# Patient Record
Sex: MALE | Race: WHITE | NOT HISPANIC OR LATINO | Employment: OTHER | URBAN - METROPOLITAN AREA
[De-identification: names, ages, dates, MRNs, and addresses within clinical notes are randomized per-mention and may not be internally consistent; named-entity substitution may affect disease eponyms.]

---

## 2019-12-12 ENCOUNTER — INITIAL CONSULT (OUTPATIENT)
Dept: PULMONOLOGY | Facility: CLINIC | Age: 74
End: 2019-12-12
Payer: MEDICARE

## 2019-12-12 VITALS
SYSTOLIC BLOOD PRESSURE: 130 MMHG | DIASTOLIC BLOOD PRESSURE: 55 MMHG | WEIGHT: 133 LBS | HEIGHT: 68 IN | BODY MASS INDEX: 20.16 KG/M2 | HEART RATE: 76 BPM | OXYGEN SATURATION: 95 %

## 2019-12-12 DIAGNOSIS — J44.9 CHRONIC OBSTRUCTIVE PULMONARY DISEASE, UNSPECIFIED COPD TYPE: Primary | ICD-10-CM

## 2019-12-12 DIAGNOSIS — C18.6 MALIGNANT NEOPLASM OF DESCENDING COLON: ICD-10-CM

## 2019-12-12 PROCEDURE — 1126F PR PAIN SEVERITY QUANTIFIED, NO PAIN PRESENT: ICD-10-PCS | Mod: S$GLB,,, | Performed by: INTERNAL MEDICINE

## 2019-12-12 PROCEDURE — 1159F PR MEDICATION LIST DOCUMENTED IN MEDICAL RECORD: ICD-10-PCS | Mod: S$GLB,,, | Performed by: INTERNAL MEDICINE

## 2019-12-12 PROCEDURE — 1159F MED LIST DOCD IN RCRD: CPT | Mod: S$GLB,,, | Performed by: INTERNAL MEDICINE

## 2019-12-12 PROCEDURE — 99204 OFFICE O/P NEW MOD 45 MIN: CPT | Mod: S$GLB,,, | Performed by: INTERNAL MEDICINE

## 2019-12-12 PROCEDURE — 1126F AMNT PAIN NOTED NONE PRSNT: CPT | Mod: S$GLB,,, | Performed by: INTERNAL MEDICINE

## 2019-12-12 PROCEDURE — 99204 PR OFFICE/OUTPT VISIT, NEW, LEVL IV, 45-59 MIN: ICD-10-PCS | Mod: S$GLB,,, | Performed by: INTERNAL MEDICINE

## 2019-12-12 RX ORDER — BRIMONIDINE TARTRATE 2 MG/ML
1 SOLUTION/ DROPS OPHTHALMIC 2 TIMES DAILY
COMMUNITY

## 2019-12-12 RX ORDER — DORZOLAMIDE HYDROCHLORIDE AND TIMOLOL MALEATE 20; 5 MG/ML; MG/ML
1 SOLUTION/ DROPS OPHTHALMIC 2 TIMES DAILY
COMMUNITY

## 2019-12-12 RX ORDER — FLUTICASONE PROPIONATE 50 MCG
1 SPRAY, SUSPENSION (ML) NASAL DAILY
COMMUNITY

## 2019-12-12 RX ORDER — PREDNISOLONE SODIUM PHOSPHATE 10 MG/ML
1 SOLUTION/ DROPS OPHTHALMIC DAILY
COMMUNITY

## 2019-12-12 RX ORDER — FAMOTIDINE 40 MG/1
40 TABLET, FILM COATED ORAL 2 TIMES DAILY
COMMUNITY

## 2019-12-12 RX ORDER — MULTIVITAMIN
1 TABLET ORAL DAILY
COMMUNITY

## 2019-12-12 RX ORDER — PYRIDOSTIGMINE BROMIDE 60 MG/1
60 TABLET ORAL 3 TIMES DAILY
COMMUNITY

## 2019-12-12 RX ORDER — AZATHIOPRINE 50 MG/1
50 TABLET ORAL 2 TIMES DAILY
COMMUNITY

## 2019-12-12 NOTE — PATIENT INSTRUCTIONS
Kicking the Smoking Habit  If you smoke, quitting is one of the best changes you can make for your heart and your overall health. Your risk of heart attack goes down within one day of putting out that last cigarette. As you go longer without smoking, your risk goes down even more. Quitting isnt easy, but millions of people have done it. You can, too. Its never too late to quit.  Getting started  Boost your chances of success by deciding on your quit plan. Your health care provider and cardiac rehab team can help you develop this plan. Even if youve already quit, its easy to slip back into smoking.  Your plan can help you avoid and recover from relapse.  In any case, start by setting a date to quit within a month, and do it.    Keys to your quit plan  · Talk to your healthcare provider about prescription medicines and nicotine replacement products that help stop the urge to smoke.   · Join a support group or quit smoking program. Talking with others about the challenges of quitting can help you get through them.  · Ask other smokers in your household to quit with you.  · Look for the cues in your life that you associate with smoking and avoid them.  Track your triggers  What gives you that I-[id number] feeling? List all the situations that make you want a cigarette. Then think of other ways to deal with these situations. Here are some examples:  Situation How I'll handle it   Finishing a meal Get up from the table and take a walk   Having an argument Find a quiet place and breathe deeply   Feeling lonely or bored Call a friend to talk         Tips for quitting successfully  · List the benefits of quitting such as reducing heart risks and saving money. Keep this list and review it whenever you feel like smoking.  · Get support. Let your friends know you may call them to chat when you have an urge to smoke.  · If youve tried to quit before without success, this time avoid the triggers that may cause  the relapse.  · Make the most of slip-ups. Try to learn from them, and then get back on track.  · Be accountable to your friends and your calendar so that you stay on track.  For family and friends  · Be supportive and patient. Quitting smoking can be difficult and stressful.  · If you smoke, nows a great time to quit. Even if you dont quit, never smoke around your loved one. Secondhand smoke is dangerous to his or her heart.  · The best goals are accomplished in teams. Remember that when your loved one states he or she wants to stop smoking.  Date Last Reviewed: 7/1/2016  © 6983-7105 [employer]. [address] [place] [address], [place] [address]. All rights reserved. This information is not intended as a substitute for professional medical care. Always follow your healthcare professional's instructions.    PFT's

## 2019-12-12 NOTE — PROGRESS NOTES
SUBJECTIVE:    Patient ID: Leonardo Cannon is a 74 y.o. male.    Chief Complaint: Establish Care (surgical clearance Dr Ordonez at [facility]/ Dr Elana danielle)    HPI The patient needs surgical clearance for a colectomy.    Past Medical History:   Diagnosis Date    Anemia     CKD (chronic kidney disease), stage III     Colon cancer     Emphysema of lung     GERD (gastroesophageal reflux disease)     Glaucoma     Myasthenia gravis     2018, [facility]     Past Surgical History:   Procedure Laterality Date    BILATERAL INGUINAL HERNIA REPAIR      CATARACT EXTRACTION, BILATERAL      CYSTOSCOPY W/ STONE MANIPULATION      DENTAL SURGERY      glaucoma shunt      LAPAROSCOPIC REPAIR OF INGUINAL HERNIA      NASAL SEPTUM SURGERY      R eyebrow      plasty    TRANSURETHRAL RESECTION OF PROSTATE       Family History   Problem Relation Age of Onset    Stroke Mother         Social History:   Marital Status: Single  Occupation:   Alcohol History:  reports that he drank alcohol.  Tobacco History:  reports that he has been smoking cigarettes. He started smoking about 65 years ago. He has a 130.00 pack-year smoking history. He has never used smokeless tobacco.  Drug History:  reports that he does not use drugs.    Review of patient's allergies indicates:  No Known Allergies    Current Outpatient Medications   Medication Sig Dispense Refill    azaTHIOprine (IMURAN) 50 mg Tab Take 50 mg by mouth 2 (two) times daily.      brimonidine 0.2% (ALPHAGAN) 0.2 % Drop 1 drop 2 (two) times daily.      dorzolamide-timolol 2-0.5% (COSOPT) 22.3-6.8 mg/mL ophthalmic solution Place 1 drop into the left eye 2 (two) times daily.      famotidine (PEPCID) 40 MG tablet Take 40 mg by mouth 2 (two) times daily.      fluticasone propionate (FLONASE) 50 mcg/actuation nasal spray 1 spray by Each Nostril route once daily.      latanoprostene bunod (VYZULTA) 0.024 % Drop Apply 1 drop to eye once. LT EYE       multivitamin (ONE DAILY MULTIVITAMIN) per tablet Take 1 tablet by mouth once daily.      prednisoLONE sodium phosphate (INFLAMASE FORTE) 1 % Drop Place 1 drop into the right eye once daily.      pyridostigmine (MESTINON) 60 mg Tab Take 60 mg by mouth 3 (three) times daily. 1/2 tablet QID       No current facility-administered medications for this visit.        Alpha-1 Antitrypsin:  Last PFT:   Last CT:IMPRESSION:  Moderate emphysematous changes with stable 21 x 11 mm juxtapleural  soft tissue mass in the right lung base most likely representing  rounded atelectasis. Malignancy cannot be completely excluded and a  follow-up PET CT is recommended    Stable scarring in the right middle lobe    Bilateral nonobstructing renal stones with multiple renal cysts    Moderate amount of fecal material throughout the colon compatible with  constipation. Short segment of irregular wall thickening of the  splenic flexure corresponding to patient's known malignancy.     Improvement of the wall thickening of the cecum and terminal ileum.  There is mild wall thickening of the cecum remaining. This may be  secondary to residual infectious or inflammatory process. Malignancy  cannot be excluded and correlation PET CT is recommended.    Sigmoid diverticulosis without diverticulitis    Moderate prostatic hypertrophy    Electronically Signed by Norma Simmons    Review of Systems  General: Feeling Well.  Eyes:Legally blind  ENT:  Sinus problems  Heart:: No chest pain or palpitations.  Lungs: lots of coughing, sputum, rhinitis, sputum is clear to white  GI: constipation and diarrhea  : No dysuria, hesitancy, or nocturia.  Musculoskeletal: lots of them hurt  Skin: has L arm issues  Neuro: tingling on L arm, fingertips numb for years  Lymph: No edema or adenopathy.  Psych: Depressed and frustrated  Endo: No weight change.    OBJECTIVE:      BP (!) 130/55 (BP Location: Left arm, Patient Position: Sitting, BP Method: Medium (Manual))    "Pulse 76   Ht 5' 8" (1.727 m)   Wt 60.3 kg (133 lb)   SpO2 95%   BMI 20.22 kg/m²     Physical Exam  GENERAL: Older thin patient in no distress.  HEENT: Pupils equal and reactive. Extraocular movements intact. Nose intact.      Pharynx moist.  NECK: Supple.   HEART: Regular rate and rhythm. No murmur or gallop auscultated.  LUNGS: Pleural rub crackles in the RLL,. Lung excursion symmetrical. No change in fremitus. No adventitial noises.  ABDOMEN: Bowel sounds present. Non-tender, no masses palpated.  EXTREMITIES: Normal muscle tone and joint movement, no cyanosis or clubbing.   LYMPHATICS: No adenopathy palpated, 2+ edema on the L, 1+ on the R..  SKIN: Dry, intact, no lesions.   NEURO: Cranial nerves II-XII intact. Motor strength 5/5 bilaterally, upper and lower extremities.  PSYCH: Appropriate affect.    Assessment:       1. Chronic obstructive pulmonary disease, unspecified COPD type    2. Malignant neoplasm of descending colon          Plan:       Chronic obstructive pulmonary disease, unspecified COPD type  -     Complete PFT with bronchodilator; Future    Malignant neoplasm of descending colon         Follow up if symptoms worsen or fail to improve.     PFT's, call patient with results  NIF  Send letter to Dr. Eagle,         "

## 2019-12-24 ENCOUNTER — HOSPITAL ENCOUNTER (OUTPATIENT)
Dept: PULMONOLOGY | Facility: HOSPITAL | Age: 74
Discharge: HOME | End: 2019-12-24
Attending: INTERNAL MEDICINE
Payer: MEDICARE

## 2019-12-24 DIAGNOSIS — J44.9 CHRONIC OBSTRUCTIVE PULMONARY DISEASE, UNSPECIFIED COPD TYPE: ICD-10-CM

## 2019-12-24 PROCEDURE — 94729 DIFFUSING CAPACITY: CPT

## 2019-12-24 PROCEDURE — 94727 GAS DIL/WSHOT DETER LNG VOL: CPT

## 2019-12-24 PROCEDURE — 94010 BREATHING CAPACITY TEST: CPT | Mod: XB

## 2019-12-24 PROCEDURE — 94060 EVALUATION OF WHEEZING: CPT

## 2020-01-06 ENCOUNTER — PATIENT MESSAGE (OUTPATIENT)
Dept: PULMONOLOGY | Facility: CLINIC | Age: 75
End: 2020-01-06

## 2020-01-07 NOTE — TELEPHONE ENCOUNTER
I spoke with the patient this morning. He is not interested in being on inhaler, just wants surgical clearance.  I also reviewed PFT results with him. Will send a clearance letter to Dr. Eagle,  is aware as well.

## 2024-12-04 DIAGNOSIS — D64.9 ANEMIA, UNSPECIFIED TYPE: Primary | ICD-10-CM

## 2024-12-04 DIAGNOSIS — D53.1 ANEMIA, MEGALOBLASTIC: ICD-10-CM

## 2024-12-05 ENCOUNTER — TELEPHONE (OUTPATIENT)
Facility: CLINIC | Age: 79
End: 2024-12-05

## 2024-12-05 NOTE — NURSING
Oncology Navigation   Intake  Date of Diagnosis: 12/04/24  Cancer Type: Benign hem  Type of Referral: External  Date of Referral: 12/04/24  Initial Nurse Navigator Contact: 12/05/24  Referral to Initial Contact Timeline (days): 1       LVM for return call to schedule. Referral to Dr. Fowler. Diagnosis megaloblastic anemia.

## 2024-12-27 ENCOUNTER — TELEPHONE (OUTPATIENT)
Facility: CLINIC | Age: 79
End: 2024-12-27

## 2024-12-27 NOTE — NURSING
Patient called requesting to reschedule his appointment with Dr. Fowler. R/S from 1/3 to 1/7/25. Time, date and location reviewed. Encouraged him to call with any other questions or concerns.

## 2025-01-07 ENCOUNTER — OFFICE VISIT (OUTPATIENT)
Facility: CLINIC | Age: 80
End: 2025-01-07
Payer: MEDICARE

## 2025-01-07 ENCOUNTER — TELEPHONE (OUTPATIENT)
Facility: CLINIC | Age: 80
End: 2025-01-07

## 2025-01-07 ENCOUNTER — LAB VISIT (OUTPATIENT)
Dept: LAB | Facility: HOSPITAL | Age: 80
End: 2025-01-07
Attending: INTERNAL MEDICINE
Payer: MEDICARE

## 2025-01-07 VITALS
HEIGHT: 61 IN | SYSTOLIC BLOOD PRESSURE: 118 MMHG | TEMPERATURE: 98 F | HEART RATE: 77 BPM | DIASTOLIC BLOOD PRESSURE: 71 MMHG | RESPIRATION RATE: 16 BRPM | BODY MASS INDEX: 24.97 KG/M2

## 2025-01-07 DIAGNOSIS — N18.4 ANEMIA ASSOCIATED WITH STAGE 4 CHRONIC RENAL FAILURE: ICD-10-CM

## 2025-01-07 DIAGNOSIS — D53.9 NUTRITIONAL ANEMIA: ICD-10-CM

## 2025-01-07 DIAGNOSIS — D53.1 ANEMIA, MEGALOBLASTIC: ICD-10-CM

## 2025-01-07 DIAGNOSIS — E07.9 THYROID DYSFUNCTION: ICD-10-CM

## 2025-01-07 DIAGNOSIS — D63.1 ANEMIA ASSOCIATED WITH STAGE 4 CHRONIC RENAL FAILURE: ICD-10-CM

## 2025-01-07 DIAGNOSIS — D53.9 NUTRITIONAL ANEMIA: Primary | ICD-10-CM

## 2025-01-07 DIAGNOSIS — N40.0 PROSTATISM: ICD-10-CM

## 2025-01-07 LAB
ALBUMIN SERPL BCP-MCNC: 3.9 G/DL (ref 3.5–5.2)
ALP SERPL-CCNC: 69 U/L (ref 55–135)
ALT SERPL W/O P-5'-P-CCNC: 6 U/L (ref 10–44)
ANION GAP SERPL CALC-SCNC: 9 MMOL/L (ref 8–16)
AST SERPL-CCNC: 13 U/L (ref 10–40)
BASOPHILS # BLD AUTO: 0.06 K/UL (ref 0–0.2)
BASOPHILS NFR BLD: 1 % (ref 0–1.9)
BILIRUB SERPL-MCNC: 0.4 MG/DL (ref 0.1–1)
BUN SERPL-MCNC: 31 MG/DL (ref 8–23)
CALCIUM SERPL-MCNC: 9.4 MG/DL (ref 8.7–10.5)
CHLORIDE SERPL-SCNC: 111 MMOL/L (ref 95–110)
CO2 SERPL-SCNC: 21 MMOL/L (ref 23–29)
CREAT SERPL-MCNC: 2.7 MG/DL (ref 0.5–1.4)
DIFFERENTIAL METHOD BLD: ABNORMAL
EOSINOPHIL # BLD AUTO: 0.5 K/UL (ref 0–0.5)
EOSINOPHIL NFR BLD: 7.5 % (ref 0–8)
ERYTHROCYTE [DISTWIDTH] IN BLOOD BY AUTOMATED COUNT: 17.5 % (ref 11.5–14.5)
EST. GFR  (NO RACE VARIABLE): 23 ML/MIN/1.73 M^2
FERRITIN SERPL-MCNC: 120.2 NG/ML (ref 20–300)
FOLATE SERPL-MCNC: 15.8 NG/ML (ref 4–24)
GLUCOSE SERPL-MCNC: 101 MG/DL (ref 70–110)
HCT VFR BLD AUTO: 27.5 % (ref 40–54)
HGB BLD-MCNC: 8.4 G/DL (ref 14–18)
IMM GRANULOCYTES # BLD AUTO: 0.01 K/UL (ref 0–0.04)
IMM GRANULOCYTES NFR BLD AUTO: 0.2 % (ref 0–0.5)
LYMPHOCYTES # BLD AUTO: 1 K/UL (ref 1–4.8)
LYMPHOCYTES NFR BLD: 17.1 % (ref 18–48)
MCH RBC QN AUTO: 32.3 PG (ref 27–31)
MCHC RBC AUTO-ENTMCNC: 30.5 G/DL (ref 32–36)
MCV RBC AUTO: 106 FL (ref 82–98)
MONOCYTES # BLD AUTO: 0.5 K/UL (ref 0.3–1)
MONOCYTES NFR BLD: 7.9 % (ref 4–15)
NEUTROPHILS # BLD AUTO: 4 K/UL (ref 1.8–7.7)
NEUTROPHILS NFR BLD: 66.3 % (ref 38–73)
NRBC BLD-RTO: 0 /100 WBC
PLATELET # BLD AUTO: 273 K/UL (ref 150–450)
PMV BLD AUTO: 10.1 FL (ref 9.2–12.9)
POTASSIUM SERPL-SCNC: 4.9 MMOL/L (ref 3.5–5.1)
PROT SERPL-MCNC: 8.1 G/DL (ref 6–8.4)
RBC # BLD AUTO: 2.6 M/UL (ref 4.6–6.2)
RETICS/RBC NFR AUTO: 1.1 % (ref 0.4–2)
SODIUM SERPL-SCNC: 141 MMOL/L (ref 136–145)
TSH SERPL DL<=0.005 MIU/L-ACNC: 3.37 UIU/ML (ref 0.34–5.6)
VIT B12 SERPL-MCNC: 312 PG/ML (ref 210–950)
WBC # BLD AUTO: 6.04 K/UL (ref 3.9–12.7)

## 2025-01-07 PROCEDURE — 82728 ASSAY OF FERRITIN: CPT | Performed by: INTERNAL MEDICINE

## 2025-01-07 PROCEDURE — 36415 COLL VENOUS BLD VENIPUNCTURE: CPT | Performed by: INTERNAL MEDICINE

## 2025-01-07 PROCEDURE — 99214 OFFICE O/P EST MOD 30 MIN: CPT | Mod: PBBFAC,PN | Performed by: INTERNAL MEDICINE

## 2025-01-07 PROCEDURE — 80053 COMPREHEN METABOLIC PANEL: CPT | Performed by: INTERNAL MEDICINE

## 2025-01-07 PROCEDURE — 84402 ASSAY OF FREE TESTOSTERONE: CPT | Performed by: INTERNAL MEDICINE

## 2025-01-07 PROCEDURE — 82607 VITAMIN B-12: CPT | Performed by: INTERNAL MEDICINE

## 2025-01-07 PROCEDURE — 85025 COMPLETE CBC W/AUTO DIFF WBC: CPT | Performed by: INTERNAL MEDICINE

## 2025-01-07 PROCEDURE — 99999 PR PBB SHADOW E&M-EST. PATIENT-LVL IV: CPT | Mod: PBBFAC,,, | Performed by: INTERNAL MEDICINE

## 2025-01-07 PROCEDURE — 84443 ASSAY THYROID STIM HORMONE: CPT | Performed by: INTERNAL MEDICINE

## 2025-01-07 PROCEDURE — G2211 COMPLEX E/M VISIT ADD ON: HCPCS | Mod: S$PBB,,, | Performed by: INTERNAL MEDICINE

## 2025-01-07 PROCEDURE — 85045 AUTOMATED RETICULOCYTE COUNT: CPT | Performed by: INTERNAL MEDICINE

## 2025-01-07 PROCEDURE — 82746 ASSAY OF FOLIC ACID SERUM: CPT | Performed by: INTERNAL MEDICINE

## 2025-01-07 PROCEDURE — 99205 OFFICE O/P NEW HI 60 MIN: CPT | Mod: S$PBB,,, | Performed by: INTERNAL MEDICINE

## 2025-01-07 PROCEDURE — 82668 ASSAY OF ERYTHROPOIETIN: CPT | Performed by: INTERNAL MEDICINE

## 2025-01-07 PROCEDURE — 82784 ASSAY IGA/IGD/IGG/IGM EACH: CPT | Mod: 91 | Performed by: INTERNAL MEDICINE

## 2025-01-07 RX ORDER — FERROUS SULFATE 325(65) MG
325 TABLET ORAL
COMMUNITY

## 2025-01-07 RX ORDER — ALBUTEROL SULFATE 90 UG/1
2 INHALANT RESPIRATORY (INHALATION) EVERY 4 HOURS PRN
COMMUNITY

## 2025-01-07 NOTE — LETTER
January 14, 2025        Henry Tierney MD  [address]  [facility] [place] [address]             [facility]  [address]  [place] [address]  Phone: [phone]  Fax: [phone]   Patient: Aleksandr Cannon   MR Number: [id number]   YOB: 1945   Date of Visit: 1/7/2025       Dear Dr. Tierney:    Thank you for referring Aleksandr Cannon to me for evaluation. Below are the relevant portions of my assessment and plan of care.            If you have questions, please do not hesitate to call me. I look forward to following Aleksandr along with you.    Sincerely,      VLAD Fowler MD           CC  No Recipients

## 2025-01-07 NOTE — TELEPHONE ENCOUNTER
Faxed lab orders to  - [phone]    Immunofixation Electrophoresis [[id number]] (Order [id number])   Protein Electrophoresis, Serum [EMH038] (Order [id number])     1st floor lab to be resulted at [facility]

## 2025-01-09 LAB
IGA SERPL-MCNC: 54 MG/DL (ref 61–437)
IGG SERPL-MCNC: 907 MG/DL (ref 603–1613)
IGM SERPL-MCNC: 2385 MG/DL (ref 15–143)

## 2025-01-10 LAB
EPO SERPL-ACNC: 24.6 MIU/ML (ref 2.6–18.5)
TESTOST FREE SERPL-MCNC: 1.1 PG/ML (ref 6.6–18.1)

## 2025-01-17 ENCOUNTER — TELEPHONE (OUTPATIENT)
Facility: CLINIC | Age: 80
End: 2025-01-17
Payer: MEDICARE

## 2025-01-17 NOTE — NURSING
Received call from patient. He asked about lab work that was ordered during last Dr. Fowler appointment. Informed patient that lab orders were faxed to [facility]. Patient inquired about lung imaging that Dr. Fowler wanted him to complete. This was not noted in chart. Sent staff message to office to inform patient and if needed send imaging orders to [place] to complete.

## 2025-01-19 DIAGNOSIS — R91.8 LUNG MASS: Primary | ICD-10-CM

## 2025-01-19 DIAGNOSIS — R91.1 SOLITARY PULMONARY NODULE: ICD-10-CM

## 2025-01-25 ENCOUNTER — TELEPHONE (OUTPATIENT)
Facility: CLINIC | Age: 80
End: 2025-01-25
Payer: MEDICARE

## 2025-01-25 NOTE — TELEPHONE ENCOUNTER
Spoke to Patient.  Explained that CT orders were faxed to . Patient verbally demonstrated understanding.

## 2025-01-25 NOTE — TELEPHONE ENCOUNTER
----- Message from July sent at 1/24/2025 12:05 PM CST -----  Pt is asking for CT orders to be changed to [place] from Imaging. Please call pt to let him know when orders are sent.     [phone]

## 2025-02-04 ENCOUNTER — TELEPHONE (OUTPATIENT)
Facility: CLINIC | Age: 80
End: 2025-02-04
Payer: MEDICARE

## 2025-02-04 NOTE — TELEPHONE ENCOUNTER
Spoke to patient. He explained that he did not have any way of seeing his lab or imaging (CT chest) results.  He asked that we mail him a copy after Dr. Tony makes his assessment.   We verified address. I added that it may be tomorrow before we can get back with him. Patient verbally demonstrated understanding.

## 2025-02-05 ENCOUNTER — TELEPHONE (OUTPATIENT)
Facility: CLINIC | Age: 80
End: 2025-02-05
Payer: MEDICARE

## 2025-02-05 NOTE — NURSING
Returned call to patient. He stated he completed lab and CT scan at [facility] and would like to move up his appointment from May. Reviewed Dr. Fowler last office visit note. Recommendation was to RTC 2-3 weeks. Rescheduled patient to March 10th. Patient stated he needed time to find transportation. Sent message to Dr. Fowler staff.

## 2025-03-07 ENCOUNTER — TELEPHONE (OUTPATIENT)
Facility: CLINIC | Age: 80
End: 2025-03-07
Payer: MEDICARE

## 2025-03-18 ENCOUNTER — OFFICE VISIT (OUTPATIENT)
Facility: CLINIC | Age: 80
End: 2025-03-18
Payer: MEDICARE

## 2025-03-18 VITALS
BODY MASS INDEX: 22.66 KG/M2 | OXYGEN SATURATION: 100 % | DIASTOLIC BLOOD PRESSURE: 65 MMHG | WEIGHT: 120 LBS | TEMPERATURE: 97 F | HEIGHT: 61 IN | SYSTOLIC BLOOD PRESSURE: 116 MMHG | RESPIRATION RATE: 16 BRPM | HEART RATE: 86 BPM

## 2025-03-18 DIAGNOSIS — R91.1 SOLITARY PULMONARY NODULE: ICD-10-CM

## 2025-03-18 DIAGNOSIS — E53.8 VITAMIN B12 DEFICIENCY DUE TO INTESTINAL MALABSORPTION: ICD-10-CM

## 2025-03-18 DIAGNOSIS — K90.9 VITAMIN B12 DEFICIENCY DUE TO INTESTINAL MALABSORPTION: ICD-10-CM

## 2025-03-18 DIAGNOSIS — D63.1 ANEMIA ASSOCIATED WITH STAGE 4 CHRONIC RENAL FAILURE: ICD-10-CM

## 2025-03-18 DIAGNOSIS — N18.4 ANEMIA ASSOCIATED WITH STAGE 4 CHRONIC RENAL FAILURE: ICD-10-CM

## 2025-03-18 DIAGNOSIS — J98.4 CAVITATING MASS OF MIDDLE LOBE OF RIGHT LUNG: Primary | ICD-10-CM

## 2025-03-18 PROCEDURE — 99999 PR PBB SHADOW E&M-EST. PATIENT-LVL IV: CPT | Mod: PBBFAC,,, | Performed by: INTERNAL MEDICINE

## 2025-03-18 PROCEDURE — 99214 OFFICE O/P EST MOD 30 MIN: CPT | Mod: PBBFAC,PN | Performed by: INTERNAL MEDICINE

## 2025-03-18 NOTE — PROGRESS NOTES
[facility] [address] Hematology Oncology In Office Subsequent Encounter Note    3/18/25    Subjective:      Patient ID:   Aleksandr Cannon  79 y.o. male  1945  Tru Tierney    Chief Complaint:   Anemia  (Follow up)      HPI:  79 y.o. male here for evaluation of anemia.  He has history of colon cancer treated with surgery per Dr. Eagle 7 or 8 years ago.  He last saw Dr. Huitron approximately 3 years ago.  Energy level is 3/10.    Medications include Imuran, Pepcid, Mestinon, Vyzulta    He is a current smoker of 2 packs per day for 70 years, starting in 1954.  He does not drink alcohol with regularity.  He does not have a history of drug allergies.    He has history of glaucoma, COPD, BPH, colon cancer 7 or 8 years ago, chronic kidney disease stage 3, GERD, and myasthenia gravis diagnosed in 2018.    Hemoglobin has gone from 8.4-10.5 with an MCV of 102.  His EGFR is 25 and his testosterone is 1.1.  He has a IgM of 03/20/1985.  Erythropoietin is 24.6 and his ferritin is now 120 with a folate of 15.8 and a B12 of 312.  He is to take B12 sublingually.  Labs will be rechecked at [facility] in June 20, 2025.  He sees Dr. Tariq  for his pulmonary evaluation.  He is on iron, multivitamin, Imuran for treatment of myasthenia gravis.    He has burning symptoms going down laterally at the left leg raising question of mononeuropathy.  He follows up with Dr. Tierney.    A CT scan from January 27, 2025 shows a 3.9 cm mass with cavitation in right lung and right pleural effusion is seen.  This raises question of malignancy or infection.  Refer to  for evaluation.    ROS:   GEN: + weight loss  HEENT: normal with no HA's, sore throat, stiff neck, changes in vision  CV: normal with no CP, SOB, PND, FRANKLIN or orthopnea  PULM: COPD Hx, 1.9 cm nodule on lung, checked with CT chest.  GI: See HPI  : normal with no hematuria, dysuria  BREAST: normal with no mass, discharge, pain  SKIN: normal with no  rash, erythema, bruising, or swelling     Past Medical History:   Diagnosis Date    Anemia     CKD (chronic kidney disease), stage III     Colon cancer     Emphysema of lung     GERD (gastroesophageal reflux disease)     Glaucoma     Myasthenia gravis     2018, [facility]     Past Surgical History:   Procedure Laterality Date    BILATERAL INGUINAL HERNIA REPAIR      CATARACT EXTRACTION, BILATERAL      CYSTOSCOPY W/ STONE MANIPULATION      DENTAL SURGERY      glaucoma shunt      LAPAROSCOPIC REPAIR OF INGUINAL HERNIA      NASAL SEPTUM SURGERY      R eyebrow      plasty    TRANSURETHRAL RESECTION OF PROSTATE         Review of patient's allergies indicates:  No Known Allergies  Social History     Socioeconomic History    Marital status: Single   Tobacco Use    Smoking status: Every Day     Current packs/day: 2.00     Average packs/day: 2.0 packs/day for 70.7 years (141.4 ttl pk-yrs)     Types: Cigarettes     Start date: 7/12/1954    Smokeless tobacco: Never   Substance and Sexual Activity    Alcohol use: Not Currently     Comment: quit 3 years ago.     Drug use: Never    Sexual activity: Not Currently     Social Drivers of Health     Physical Activity: Sufficiently Active (11/9/2020)    Received from [facility]    Exercise Vital Sign     Days of Exercise per Week: 7 days     Minutes of Exercise per Session: 60 min   Stress: Stress Concern Present (11/9/2020)    Received from [facility] and [facility]    [employer] - Occupational Stress Questionnaire     Feeling of Stress : To some extent         Current Outpatient Medications:     albuterol (PROVENTIL/VENTOLIN HFA) 90 mcg/actuation inhaler, Inhale 2 puffs into the lungs every 4 (four) hours as needed., Disp: , Rfl:     azaTHIOprine (IMURAN) 50 mg Tab, Take 50 mg by mouth 2 (two) times daily., Disp: , Rfl:     brimonidine 0.2% (ALPHAGAN) 0.2 % Drop, 1 drop 2 (two) times daily.,  "Disp: , Rfl:     famotidine (PEPCID) 40 MG tablet, Take 40 mg by mouth 2 (two) times daily., Disp: , Rfl:     ferrous sulfate (FEOSOL) 325 mg (65 mg iron) Tab tablet, Take 325 mg by mouth with breakfast., Disp: , Rfl:     fluticasone propionate (FLONASE) 50 mcg/actuation nasal spray, 1 spray by Each Nostril route once daily., Disp: , Rfl:     latanoprostene bunod (VYZULTA) 0.024 % Drop, Apply 1 drop to eye once. LT EYE, Disp: , Rfl:     multivitamin (ONE DAILY MULTIVITAMIN) per tablet, Take 1 tablet by mouth once daily., Disp: , Rfl:     prednisoLONE sodium phosphate (INFLAMASE FORTE) 1 % Drop, Place 1 drop into the right eye once daily., Disp: , Rfl:     pyridostigmine (MESTINON) 60 mg Tab, Take 60 mg by mouth 3 (three) times daily. 1/2 tablet QID, Disp: , Rfl:     dorzolamide-timolol 2-0.5% (COSOPT) 22.3-6.8 mg/mL ophthalmic solution, Place 1 drop into the left eye 2 (two) times daily. (Patient not taking: Reported on 3/18/2025), Disp: , Rfl:           Objective:   Vitals:  Blood pressure 116/65, pulse 86, temperature 97 °F (36.1 °C), temperature source Temporal, resp. rate 16, height 5' 1.2" (1.554 m), weight 54.4 kg (120 lb), SpO2 100%.    Physical Examination:   GEN: no apparent distress, comfortable  HEAD: atraumatic and normocephalic  EYES: no pallor, no icterus  ENT:  no pharyngeal erythema, external ears WNL; no nasal discharge  NECK: no masses, thyroid normal, trachea midline, no LAD/LN's, supple  CV: RRR with no murmur; normal pulse; no pedal edema  CHEST: Normal respiratory effort; CTAB; normal breath sounds; no wheeze or crackles  ABDOM: nontender and nondistended; soft;  no rebound/guarding, L/S NP  MUSC/Skeletal: ROM normal; no crepitus; joints normal  EXTREM: no clubbing, cyanosis, inflammation or swelling  SKIN: no rashes, lesions, ulcers, petechiae   : no cvat  NEURO: grossly intact; motor/sensory WNL;  no tremors  PSYCH: normal mood, affect and behavior  LYMPH: normal cervical, supraclavicular, " axillary LN's      Labs:   Lab Results   Component Value Date    WBC 6.04 01/07/2025    HGB 8.4 (L) 01/07/2025    HCT 27.5 (L) 01/07/2025     (H) 01/07/2025     01/07/2025    CMP  Sodium   Date Value Ref Range Status   01/07/2025 141 136 - 145 mmol/L Final     Potassium   Date Value Ref Range Status   01/07/2025 4.9 3.5 - 5.1 mmol/L Final     Chloride   Date Value Ref Range Status   01/07/2025 111 (H) 95 - 110 mmol/L Final     CO2   Date Value Ref Range Status   01/07/2025 21 (L) 23 - 29 mmol/L Final     Glucose   Date Value Ref Range Status   01/07/2025 101 70 - 110 mg/dL Final     BUN   Date Value Ref Range Status   01/07/2025 31 (H) 8 - 23 mg/dL Final     Creatinine   Date Value Ref Range Status   01/07/2025 2.7 (H) 0.5 - 1.4 mg/dL Final     Calcium   Date Value Ref Range Status   01/07/2025 9.4 8.7 - 10.5 mg/dL Final     Total Protein   Date Value Ref Range Status   01/07/2025 8.1 6.0 - 8.4 g/dL Final     Albumin   Date Value Ref Range Status   01/07/2025 3.9 3.5 - 5.2 g/dL Final     Total Bilirubin   Date Value Ref Range Status   01/07/2025 0.4 0.1 - 1.0 mg/dL Final     Comment:     For infants and newborns, interpretation of results should be based  on gestational age, weight and in agreement with clinical  observations.    Premature Infant recommended reference ranges:  Up to 24 hours.............<8.0 mg/dL  Up to 48 hours............<12.0 mg/dL  3-5 days..................<15.0 mg/dL  6-29 days.................<15.0 mg/dL       Alkaline Phosphatase   Date Value Ref Range Status   01/07/2025 69 55 - 135 U/L Final     AST   Date Value Ref Range Status   01/07/2025 13 10 - 40 U/L Final     ALT   Date Value Ref Range Status   01/07/2025 6 (L) 10 - 44 U/L Final     Anion Gap   Date Value Ref Range Status   01/07/2025 9 8 - 16 mmol/L Final     Testosterone level is 1.1, erythropoietin is 24.6, IgM is increased at 2385, B12 is 312, folate is 16, ferritin is 120, TSH is 3.367,  creatinine is 2.7 and  total protein is 8.1 and EGFR is 23 and reticulocyte count is 1.1    Assessment:   (1) 79 y.o. male with multifactorial macrocytic anemia    (2) 1st of all , this is a macrocytic anemia and this does raise the question of B12 or folate deficiency or myelodysplastic syndrome.  His B12 level is in the low-normal range at 312., we try to keep the B12 greater than 400.  Started on B12 supplement 1 sublingually daily.    (3) also there is a component of the anemia secondary to renal insufficiency with a hemoglobin of 8.6 and a erythropoietin level of 24.6 in the setting of an EGFR of 23.  Besides giving a trial of B12, I expect we will also give a trial of Procrit or Retacrit 68064 units subQ weekly with titration for a hemoglobin of 10.    (4) also a factor in the anemia is his hypotestosteronism at 1.1.    (5) of note his IgM level is increased at 2385, additional labs will be checked to see if this is a monoclonal protein spike.    (6) after protein results return and after we give him a trial of Procrit or Retacrit and B12, we can then make a decision on bone marrow aspiration and biopsy if needed.    (7) he is on chronic Imuran therapy.  The macrocytosis can be explained by chronic Imuran therapy through the effects of Imuran on impairing red blood cell maturation in the bone marrow.    (8) anemia improved with a hemoglobin of 10.6 from 8.4 on B12 and B6 supplementation.    (9) 3.9 cm cavitary mass on CT scan of chest on the right lung with right pleural effusion.  Refer to pulmonary for evaluation, Dr. Tariq.    RTC 6 weeks.    Tomas Tony MD  [facility]   3/18/25    RTC 2-3 weeks.

## 2025-03-18 NOTE — LETTER
March 22, 2025        Kavin Tariq MD  [address]  [address]  [place] [address]             [facility]  [address]  [address]  [place] [address]  Phone: [phone]  Fax: [phone]   Patient: Aleksandr Cannon   MR Number: [id number]   YOB: 1945   Date of Visit: 3/18/2025       Dear Dr. Tariq:    Thank you for referring Aleksandr Cannon to me for evaluation. Below are the relevant portions of my assessment and plan of care.            If you have questions, please do not hesitate to call me. I look forward to following Aleksandr along with you.    Sincerely,      VLAD Fowler MD           CC  No Recipients

## 2025-04-02 DIAGNOSIS — R91.8 LUNG MASS: Primary | ICD-10-CM

## 2025-04-03 ENCOUNTER — TELEPHONE (OUTPATIENT)
Dept: PULMONOLOGY | Facility: CLINIC | Age: 80
End: 2025-04-03
Payer: MEDICARE

## 2025-04-03 NOTE — TELEPHONE ENCOUNTER
Patient would like to know when is he to do his Chest CT? He just had one done January 2025. He has an appointment scheduled to see you 6/11/25.   Also would like CT order changed to [place].

## 2025-04-07 NOTE — TELEPHONE ENCOUNTER
Notified patient he has an appointment in June to see Dr. Tariq and that his CT order was faxed to [place] to schedule.

## 2025-05-08 ENCOUNTER — LAB VISIT (OUTPATIENT)
Dept: LAB | Facility: HOSPITAL | Age: 80
End: 2025-05-08
Attending: INTERNAL MEDICINE
Payer: MEDICARE

## 2025-05-08 ENCOUNTER — OFFICE VISIT (OUTPATIENT)
Facility: CLINIC | Age: 80
End: 2025-05-08
Payer: MEDICARE

## 2025-05-08 VITALS
DIASTOLIC BLOOD PRESSURE: 59 MMHG | HEIGHT: 61 IN | TEMPERATURE: 98 F | SYSTOLIC BLOOD PRESSURE: 132 MMHG | RESPIRATION RATE: 16 BRPM | BODY MASS INDEX: 22.94 KG/M2 | HEART RATE: 96 BPM | WEIGHT: 121.5 LBS | OXYGEN SATURATION: 98 %

## 2025-05-08 DIAGNOSIS — E53.8 VITAMIN B12 DEFICIENCY DUE TO INTESTINAL MALABSORPTION: ICD-10-CM

## 2025-05-08 DIAGNOSIS — K90.9 VITAMIN B12 DEFICIENCY DUE TO INTESTINAL MALABSORPTION: Primary | ICD-10-CM

## 2025-05-08 DIAGNOSIS — N18.4 ANEMIA ASSOCIATED WITH STAGE 4 CHRONIC RENAL FAILURE: ICD-10-CM

## 2025-05-08 DIAGNOSIS — D64.9 ANEMIA, UNSPECIFIED TYPE: ICD-10-CM

## 2025-05-08 DIAGNOSIS — K90.9 VITAMIN B12 DEFICIENCY DUE TO INTESTINAL MALABSORPTION: ICD-10-CM

## 2025-05-08 DIAGNOSIS — E53.8 VITAMIN B12 DEFICIENCY DUE TO INTESTINAL MALABSORPTION: Primary | ICD-10-CM

## 2025-05-08 DIAGNOSIS — D63.1 ANEMIA ASSOCIATED WITH STAGE 4 CHRONIC RENAL FAILURE: ICD-10-CM

## 2025-05-08 LAB
ABSOLUTE EOSINOPHIL (SMH): 0.18 K/UL
ABSOLUTE MONOCYTE (SMH): 0.52 K/UL (ref 0.3–1)
ABSOLUTE NEUTROPHIL COUNT (SMH): 4.9 K/UL (ref 1.8–7.7)
ANISOCYTOSIS BLD QL SMEAR: SLIGHT
BASOPHILS # BLD AUTO: 0.06 K/UL
BASOPHILS NFR BLD AUTO: 0.9 %
DACRYOCYTES BLD QL SMEAR: NORMAL
ERYTHROCYTE [DISTWIDTH] IN BLOOD BY AUTOMATED COUNT: 17 % (ref 11.5–14.5)
FERRITIN SERPL-MCNC: 38.7 NG/ML (ref 20–300)
FOLATE SERPL-MCNC: >22.3 NG/ML (ref 4–24)
HCT VFR BLD AUTO: 29.9 % (ref 40–54)
HGB BLD-MCNC: 9.4 GM/DL (ref 14–18)
HYPOCHROMIA BLD QL SMEAR: NORMAL
IMM GRANULOCYTES # BLD AUTO: 0.03 K/UL (ref 0–0.04)
IMM GRANULOCYTES NFR BLD AUTO: 0.5 % (ref 0–0.5)
LYMPHOCYTES # BLD AUTO: 0.9 K/UL (ref 1–4.8)
MCH RBC QN AUTO: 31.9 PG (ref 27–31)
MCHC RBC AUTO-ENTMCNC: 31.4 G/DL (ref 32–36)
MCV RBC AUTO: 101 FL (ref 82–98)
NUCLEATED RBC (/100WBC) (SMH): 0 /100 WBC
OVALOCYTES BLD QL SMEAR: NORMAL
PLATELET # BLD AUTO: 324 K/UL (ref 150–450)
PLATELET BLD QL SMEAR: NORMAL
PMV BLD AUTO: 10.1 FL (ref 9.2–12.9)
RBC # BLD AUTO: 2.95 M/UL (ref 4.6–6.2)
RELATIVE EOSINOPHIL (SMH): 2.7 % (ref 0–8)
RELATIVE LYMPHOCYTE (SMH): 13.7 % (ref 18–48)
RELATIVE MONOCYTE (SMH): 7.9 % (ref 4–15)
RELATIVE NEUTROPHIL (SMH): 74.3 % (ref 38–73)
RETICS/RBC NFR AUTO: 0.7 % (ref 0.4–2)
SCHISTOCYTES BLD QL SMEAR: NORMAL
VIT B12 SERPL-MCNC: >1500 PG/ML (ref 210–950)
WBC # BLD AUTO: 6.57 K/UL (ref 3.9–12.7)

## 2025-05-08 PROCEDURE — 99999 PR PBB SHADOW E&M-EST. PATIENT-LVL IV: CPT | Mod: PBBFAC,,, | Performed by: INTERNAL MEDICINE

## 2025-05-08 PROCEDURE — 85025 COMPLETE CBC W/AUTO DIFF WBC: CPT

## 2025-05-08 PROCEDURE — 82607 VITAMIN B-12: CPT

## 2025-05-08 PROCEDURE — 36415 COLL VENOUS BLD VENIPUNCTURE: CPT

## 2025-05-08 PROCEDURE — 85045 AUTOMATED RETICULOCYTE COUNT: CPT

## 2025-05-08 PROCEDURE — 82746 ASSAY OF FOLIC ACID SERUM: CPT

## 2025-05-08 PROCEDURE — 82728 ASSAY OF FERRITIN: CPT

## 2025-05-08 PROCEDURE — 99214 OFFICE O/P EST MOD 30 MIN: CPT | Mod: PBBFAC,PN | Performed by: INTERNAL MEDICINE

## 2025-05-08 PROCEDURE — 82668 ASSAY OF ERYTHROPOIETIN: CPT

## 2025-05-08 NOTE — PROGRESS NOTES
[facility] [address] Hematology Oncology In Office Subsequent Encounter Note    5/8/25    Subjective:      Patient ID:   Aleksandr Cannon  79 y.o. male  1945  Tru Tierney Schuette, Helio    Chief Complaint:   anemia    HPI:  79 y.o. male here for evaluation of anemia.  He has history of colon cancer treated with surgery per Dr. Eagle 7 or 8 years ago.  He last saw Dr. Huitron approximately 3 years ago.  Energy level is 3/10.    Medications include Imuran, Pepcid, Mestinon, Vyzulta    He is a current smoker of 2 packs per day for 70 years, starting in 1954.  He does not drink alcohol with regularity.  He does not have a history of drug allergies.    He has history of glaucoma, COPD, BPH, colon cancer 7 or 8 years ago, chronic kidney disease stage 3, GERD, and myasthenia gravis diagnosed in 2018.    Hemoglobin has gone from 8.4-10.5 with an MCV of 102.  His EGFR is 25 and his testosterone is 1.1.  He has a IgM of 03/20/1985.  Erythropoietin is 24.6 and his ferritin is now 120 with a folate of 15.8 and a B12 of 312.  He is to take B12 sublingually.  Labs will be rechecked at [facility] in June 20, 2025.  He sees Dr. Tariq  for his pulmonary evaluation.  He is on iron, multivitamin, Imuran for treatment of myasthenia gravis.    He has burning symptoms going down laterally at the left leg raising question of mononeuropathy.  He follows up with Dr. Tierney.    A CT scan from January 27, 2025 shows a 3.9 cm mass with cavitation in right lung and right pleural effusion is seen.  This raises question of malignancy or infection.  He saw  for evaluation and is scheduled for repeat CT of chest 5/23/25.  Hgb is better through his on measures, he is taking Fe and vitamin supplements.  Hgb went from 8.4 up to 10.5 now.  Recheck lab in 4 months.    ROS:   GEN: + weight loss  HEENT: normal with no HA's, sore throat, stiff neck, changes in vision  CV: normal with no CP, SOB, PND, FRANKLIN or  orthopnea  PULM: COPD Hx, 1.9 cm nodule on lung, checked with CT chest.  GI: See HPI  : normal with no hematuria, dysuria  BREAST: normal with no mass, discharge, pain  SKIN: normal with no rash, erythema, bruising, or swelling     Past Medical History:   Diagnosis Date    Anemia     CKD (chronic kidney disease), stage III     Colon cancer     Emphysema of lung     GERD (gastroesophageal reflux disease)     Glaucoma     Myasthenia gravis     2018, [facility]     Past Surgical History:   Procedure Laterality Date    BILATERAL INGUINAL HERNIA REPAIR      CATARACT EXTRACTION, BILATERAL      CYSTOSCOPY W/ STONE MANIPULATION      DENTAL SURGERY      glaucoma shunt      LAPAROSCOPIC REPAIR OF INGUINAL HERNIA      NASAL SEPTUM SURGERY      R eyebrow      plasty    TRANSURETHRAL RESECTION OF PROSTATE         Review of patient's allergies indicates:   Allergen Reactions    Ciprofloxacin Other (See Comments)     Causes Myasthenia Gravis.  Patient has a history of this.     Social History     Socioeconomic History    Marital status: Single   Tobacco Use    Smoking status: Every Day     Current packs/day: 2.00     Average packs/day: 2.0 packs/day for 70.8 years (141.7 ttl pk-yrs)     Types: Cigarettes     Start date: 7/12/1954    Smokeless tobacco: Never   Substance and Sexual Activity    Alcohol use: Not Currently     Comment: quit 3 years ago.     Drug use: Never    Sexual activity: Not Currently     Social Drivers of Health     Financial Resource Strain: Low Risk  (4/10/2025)    Received from [facility] and [facility]    Overall Financial Resource Strain (CARDIA)     Difficulty of Paying Living Expenses: Not hard at all   Food Insecurity: No Food Insecurity (4/10/2025)    Received from [facility] and [facility]    Hunger Vital Sign     Worried About Running Out of Food in the Last Year: Never true     Ran Out of Food in the Last Year: Never true   Transportation Needs: No  Transportation Needs (4/10/2025)    Received from [facility] and [facility]    PRAPARE - Transportation     Lack of Transportation (Medical): No     Lack of Transportation (Non-Medical): No   Physical Activity: Inactive (4/10/2025)    Received from [facility] and [facility]    Exercise Vital Sign     Days of Exercise per Week: 0 days     Minutes of Exercise per Session: 0 min   Stress: No Stress Concern Present (4/10/2025)    Received from [facility]    [employer] - Occupational Stress Questionnaire     Feeling of Stress : Not at all   Housing Stability: Low Risk  (4/10/2025)    Received from [facility] and [facility]    Housing Stability Vital Sign     Unable to Pay for Housing in the Last Year: No     Number of Times Moved in the Last Year: 0     Homeless in the Last Year: No         Current Outpatient Medications:     albuterol (PROVENTIL/VENTOLIN HFA) 90 mcg/actuation inhaler, Inhale 2 puffs into the lungs every 4 (four) hours as needed., Disp: , Rfl:     azaTHIOprine (IMURAN) 50 mg Tab, Take 50 mg by mouth 2 (two) times daily., Disp: , Rfl:     brimonidine 0.2% (ALPHAGAN) 0.2 % Drop, 1 drop 2 (two) times daily., Disp: , Rfl:     famotidine (PEPCID) 40 MG tablet, Take 40 mg by mouth 2 (two) times daily., Disp: , Rfl:     ferrous sulfate (FEOSOL) 325 mg (65 mg iron) Tab tablet, Take 325 mg by mouth with breakfast., Disp: , Rfl:     fluticasone propionate (FLONASE) 50 mcg/actuation nasal spray, 1 spray by Each Nostril route once daily., Disp: , Rfl:     latanoprostene bunod (VYZULTA) 0.024 % Drop, Apply 1 drop to eye once. LT EYE, Disp: , Rfl:     multivitamin (ONE DAILY MULTIVITAMIN) per tablet, Take 1 tablet by mouth once daily., Disp: , Rfl:     prednisoLONE sodium phosphate (INFLAMASE FORTE) 1 % Drop, Place 1 drop into the right eye once daily., Disp: , Rfl:     pyridostigmine  "(MESTINON) 60 mg Tab, Take 60 mg by mouth 3 (three) times daily. 1/2 tablet QID, Disp: , Rfl:     dorzolamide-timolol 2-0.5% (COSOPT) 22.3-6.8 mg/mL ophthalmic solution, Place 1 drop into the left eye 2 (two) times daily. (Patient not taking: Reported on 5/8/2025), Disp: , Rfl:           Objective:   Vitals:  Blood pressure (!) 132/59, pulse 96, temperature 98.2 °F (36.8 °C), temperature source Temporal, resp. rate 16, height 5' 1.2" (1.554 m), weight 55.1 kg (121 lb 8 oz), SpO2 98%.    Physical Examination:   GEN: no apparent distress, comfortable  HEAD: atraumatic and normocephalic  EYES: no pallor, no icterus  ENT:  no pharyngeal erythema, external ears WNL; no nasal discharge  NECK: no masses, thyroid normal, trachea midline, no LAD/LN's, supple  CV: RRR with no murmur; normal pulse; no pedal edema  CHEST: Normal respiratory effort; CTAB; normal breath sounds; no wheeze or crackles  ABDOM: nontender and nondistended; soft;  no rebound/guarding, L/S NP  MUSC/Skeletal: ROM normal; no crepitus; joints normal  EXTREM: no clubbing, cyanosis, inflammation or swelling  SKIN: no rashes, lesions, ulcers, petechiae   : no cvat  NEURO: grossly intact; motor/sensory WNL;  no tremors  PSYCH: normal mood, affect and behavior  LYMPH: normal cervical, supraclavicular, axillary LN's      Labs:   Lab Results   Component Value Date    WBC 6.57 05/08/2025    HGB 9.4 (L) 05/08/2025    HCT 29.9 (L) 05/08/2025     (H) 05/08/2025     05/08/2025    CMP  Sodium   Date Value Ref Range Status   01/07/2025 141 136 - 145 mmol/L Final     Potassium   Date Value Ref Range Status   01/07/2025 4.9 3.5 - 5.1 mmol/L Final     Chloride   Date Value Ref Range Status   01/07/2025 111 (H) 95 - 110 mmol/L Final     CO2   Date Value Ref Range Status   01/07/2025 21 (L) 23 - 29 mmol/L Final     Glucose   Date Value Ref Range Status   01/07/2025 101 70 - 110 mg/dL Final     BUN   Date Value Ref Range Status   01/07/2025 31 (H) 8 - 23 mg/dL " Final     Creatinine   Date Value Ref Range Status   01/07/2025 2.7 (H) 0.5 - 1.4 mg/dL Final     Calcium   Date Value Ref Range Status   01/07/2025 9.4 8.7 - 10.5 mg/dL Final     Total Protein   Date Value Ref Range Status   01/07/2025 8.1 6.0 - 8.4 g/dL Final     Albumin   Date Value Ref Range Status   01/07/2025 3.9 3.5 - 5.2 g/dL Final     Total Bilirubin   Date Value Ref Range Status   01/07/2025 0.4 0.1 - 1.0 mg/dL Final     Comment:     For infants and newborns, interpretation of results should be based  on gestational age, weight and in agreement with clinical  observations.    Premature Infant recommended reference ranges:  Up to 24 hours.............<8.0 mg/dL  Up to 48 hours............<12.0 mg/dL  3-5 days..................<15.0 mg/dL  6-29 days.................<15.0 mg/dL       Alkaline Phosphatase   Date Value Ref Range Status   01/07/2025 69 55 - 135 U/L Final     AST   Date Value Ref Range Status   01/07/2025 13 10 - 40 U/L Final     ALT   Date Value Ref Range Status   01/07/2025 6 (L) 10 - 44 U/L Final     Anion Gap   Date Value Ref Range Status   01/07/2025 9 8 - 16 mmol/L Final     Testosterone level is 1.1, erythropoietin is 24.6, IgM is increased at 2385, B12 is 312, folate is 16, ferritin is 120, TSH is 3.367,  creatinine is 2.7 and total protein is 8.1 and EGFR is 23 and reticulocyte count is 1.1    Assessment:   (1) 79 y.o. male with multifactorial macrocytic anemia    (2) 1st of all , this is a macrocytic anemia and this does raise the question of B12 or folate deficiency or myelodysplastic syndrome.  His B12 level is in the low-normal range at 312., we try to keep the B12 greater than 400.  Started on B12 supplement 1 sublingually daily.    (3) also there is a component of the anemia secondary to renal insufficiency with a hemoglobin of 8.6 and a erythropoietin level of 24.6 in the setting of an EGFR of 23.  Besides giving a trial of B12, I expect we will also give a trial of Procrit or  Retacrit 17661 units subQ weekly with titration for a hemoglobin of 10.    (4) also a factor in the anemia is his hypotestosteronism at 1.1.    (5) of note his IgM level is increased at 2385, additional labs will be checked to see if this is a monoclonal protein spike.    (6) after protein results return and after we give him a trial of Procrit or Retacrit and B12, we can then make a decision on bone marrow aspiration and biopsy if needed.    (7) he is on chronic Imuran therapy.  The macrocytosis can be explained by chronic Imuran therapy through the effects of Imuran on impairing red blood cell maturation in the bone marrow.    (8) anemia improved with a hemoglobin of 10.6 from 8.4 on B12 and B6 supplementation.    (9) 3.9 cm cavitary mass on CT scan of chest on the right lung with right pleural effusion.  He is seeing  Dr. Tariq.  Due for repeat CT of chest 5/23/25.    (10)He is taking Fe supplements and Vitamin supplements.  Hgb went from 8.4 up to 10.5.  Continue his current regimen.    RTC 4 months with lab.    Tomas Tony MD  [facility]   5/8/25                      No assistance needed

## 2025-05-12 LAB — EPO SERPL-ACNC: 21 MIU/ML (ref 2.6–18.5)

## 2025-06-11 ENCOUNTER — HOSPITAL ENCOUNTER (OUTPATIENT)
Dept: PULMONOLOGY | Facility: HOSPITAL | Age: 80
Discharge: HOME | End: 2025-06-11
Attending: INTERNAL MEDICINE
Payer: MEDICARE

## 2025-06-11 ENCOUNTER — RESULTS FOLLOW-UP (OUTPATIENT)
Dept: PULMONOLOGY | Facility: HOSPITAL | Age: 80
End: 2025-06-11

## 2025-06-11 ENCOUNTER — OFFICE VISIT (OUTPATIENT)
Dept: PULMONOLOGY | Facility: CLINIC | Age: 80
End: 2025-06-11
Payer: MEDICARE

## 2025-06-11 VITALS
SYSTOLIC BLOOD PRESSURE: 118 MMHG | OXYGEN SATURATION: 97 % | DIASTOLIC BLOOD PRESSURE: 72 MMHG | WEIGHT: 122.69 LBS | HEART RATE: 105 BPM | BODY MASS INDEX: 23.03 KG/M2

## 2025-06-11 DIAGNOSIS — J98.4 CAVITATING MASS OF MIDDLE LOBE OF RIGHT LUNG: ICD-10-CM

## 2025-06-11 DIAGNOSIS — J44.9 CHRONIC OBSTRUCTIVE PULMONARY DISEASE, UNSPECIFIED COPD TYPE: ICD-10-CM

## 2025-06-11 DIAGNOSIS — J44.9 CHRONIC OBSTRUCTIVE PULMONARY DISEASE, UNSPECIFIED COPD TYPE: Primary | ICD-10-CM

## 2025-06-11 DIAGNOSIS — F17.210 CIGARETTE SMOKER: ICD-10-CM

## 2025-06-11 DIAGNOSIS — R91.8 LUNG MASS: ICD-10-CM

## 2025-06-11 DIAGNOSIS — R91.1 SOLITARY PULMONARY NODULE: ICD-10-CM

## 2025-06-11 DIAGNOSIS — D63.1 ANEMIA ASSOCIATED WITH STAGE 4 CHRONIC RENAL FAILURE: ICD-10-CM

## 2025-06-11 DIAGNOSIS — N18.4 ANEMIA ASSOCIATED WITH STAGE 4 CHRONIC RENAL FAILURE: ICD-10-CM

## 2025-06-11 PROCEDURE — 99204 OFFICE O/P NEW MOD 45 MIN: CPT | Mod: S$GLB,,, | Performed by: INTERNAL MEDICINE

## 2025-06-11 PROCEDURE — 94010 BREATHING CAPACITY TEST: CPT | Performed by: CLINIC/CENTER

## 2025-06-11 PROCEDURE — 94727 GAS DIL/WSHOT DETER LNG VOL: CPT | Performed by: CLINIC/CENTER

## 2025-06-11 PROCEDURE — 94729 DIFFUSING CAPACITY: CPT | Performed by: CLINIC/CENTER

## 2025-06-11 RX ORDER — ALBUTEROL SULFATE 90 UG/1
2 INHALANT RESPIRATORY (INHALATION) EVERY 4 HOURS PRN
Qty: 18 G | Refills: 11 | Status: SHIPPED | OUTPATIENT
Start: 2025-06-11

## 2025-06-11 RX ORDER — DORZOLAMIDE HCL 20 MG/ML
1 SOLUTION/ DROPS OPHTHALMIC 2 TIMES DAILY
COMMUNITY
Start: 2025-05-21

## 2025-06-11 RX ORDER — ALBUTEROL SULFATE 90 UG/1
2 INHALANT RESPIRATORY (INHALATION) EVERY 4 HOURS PRN
Qty: 18 G | Refills: 11 | Status: SHIPPED | OUTPATIENT
Start: 2025-06-11 | End: 2025-06-11

## 2025-06-11 NOTE — ASSESSMENT & PLAN NOTE
We discussed about smoking  At one point he says he smoked 5-6 PPD  Will continue to follow and see if we can get him to stop

## 2025-06-11 NOTE — ASSESSMENT & PLAN NOTE
Will continue present medications  Will send for PFT and walk test to assess severity of his COPD  RTC 1 month

## 2025-06-11 NOTE — ASSESSMENT & PLAN NOTE
We have requested discs from [facility]   CT report is stable from 1/2025 to no  Have ordered PET scan   Further decision after I am able to review old studies and PET scan  He denies any known ho of exposure to TB  No fever, chills, night sweats  He does have weight issues but this has been a problem for a while

## 2025-06-11 NOTE — PROGRESS NOTES
New Office Visit/Consultation Note    Patient Name: Aleksandr Cannon  MRN: [id number]  : 1945      Reason for visit: Cavitary lung mass    HPI:     2025 - Pt with ho COPD (PFT  Moderate FEV1 55%, DLCO 23%, TLC - 73%), colon cancer (s/p surgery about 7 years ago, no chemo needed, BARB), + smoking (! PPD - highest was 6 PPD), myasthenia gravis had a known small nodule at right lung base stable since .  Had CT chest 2025 with a new cavitary lesion.  A referral was sent to me and we repeated CT chest which looks stable.  We have requested that the disc from [facility] be sent here for review.      Past Medical History    Past Medical History:   Diagnosis Date    Anemia     CKD (chronic kidney disease), stage III     Colon cancer     Emphysema of lung     GERD (gastroesophageal reflux disease)     Glaucoma     Myasthenia gravis     , [facility]       Past Surgical History    Past Surgical History:   Procedure Laterality Date    BILATERAL INGUINAL HERNIA REPAIR      CATARACT EXTRACTION, BILATERAL      CYSTOSCOPY W/ STONE MANIPULATION      DENTAL SURGERY      glaucoma shunt      LAPAROSCOPIC REPAIR OF INGUINAL HERNIA      NASAL SEPTUM SURGERY      R eyebrow      plasty    TRANSURETHRAL RESECTION OF PROSTATE         Medications    Current Medications[1]    Allergies    Review of patient's allergies indicates:   Allergen Reactions    Ciprofloxacin Other (See Comments)     Causes Myasthenia Gravis.  Patient has a history of this.       SocHx    Tobacco Use History[2]    Social History     Substance and Sexual Activity   Alcohol Use Not Currently    Comment: quit 3 years ago.        FMHx    Family History   Problem Relation Name Age of Onset    Stroke Mother           Review of Systems  Review of Systems   Constitutional:  Positive for weight loss (lolst 50# about 10 years ago and may be slowly losing some weight, definitnely not gaining weight). Negative for chills, diaphoresis, fever and  malaise/fatigue.   HENT:  Negative for congestion.    Eyes:  Negative for pain.   Respiratory:  Negative for cough, hemoptysis, sputum production, shortness of breath, wheezing and stridor.    Cardiovascular:  Negative for chest pain, palpitations, orthopnea, claudication, leg swelling and PND.   Gastrointestinal:  Negative for abdominal pain, constipation, diarrhea, heartburn, nausea and vomiting.        No appetite  H/o colon cancer   Genitourinary:  Negative for dysuria, frequency and urgency.   Musculoskeletal:  Negative for falls and myalgias.   Neurological:  Negative for sensory change, focal weakness and weakness.   Psychiatric/Behavioral:  Negative for depression. The patient is not nervous/anxious.        Physical Exam    Vitals:    06/11/25 0926   BP: 118/72   BP Location: Left arm   Patient Position: Sitting   Pulse: (P) 71   SpO2: 97%   Weight: 55.7 kg (122 lb 11.2 oz)       Physical Exam  Vitals and nursing note reviewed.   Constitutional:       General: He is not in acute distress.     Appearance: Normal appearance. He is well-developed. He is not ill-appearing, toxic-appearing or diaphoretic.      Comments: Thin  Pale    HENT:      Head: Normocephalic and atraumatic.      Right Ear: External ear normal.      Left Ear: External ear normal.      Nose: Nose normal.   Eyes:      General: No scleral icterus.        Right eye: No discharge.         Left eye: No discharge.      Extraocular Movements: Extraocular movements intact.      Conjunctiva/sclera: Conjunctivae normal.      Pupils: Pupils are equal, round, and reactive to light.   Neck:      Thyroid: No thyromegaly.      Vascular: No JVD.      Trachea: No tracheal deviation.   Cardiovascular:      Rate and Rhythm: Normal rate and regular rhythm.      Heart sounds: Normal heart sounds. No murmur heard.     No friction rub. No gallop.   Pulmonary:      Effort: Pulmonary effort is normal. No respiratory distress.      Breath sounds: Normal breath sounds.  No stridor. No wheezing or rales.   Chest:      Chest wall: No tenderness.   Abdominal:      General: Bowel sounds are normal. There is no distension.      Palpations: Abdomen is soft.      Tenderness: There is no abdominal tenderness. There is no guarding.   Musculoskeletal:         General: No tenderness. Normal range of motion.      Cervical back: Normal range of motion and neck supple.      Right lower leg: No edema.      Left lower leg: No edema.   Lymphadenopathy:      Cervical: No cervical adenopathy.   Neurological:      Mental Status: He is alert and oriented to person, place, and time.      Cranial Nerves: No cranial nerve deficit.      Deep Tendon Reflexes: Reflexes are normal and symmetric.   Psychiatric:         Mood and Affect: Mood normal.         Behavior: Behavior normal.         Thought Content: Thought content normal.         Judgment: Judgment normal.         Labs    Lab Results   Component Value Date    WBC 6.57 05/08/2025    HGB 9.4 (L) 05/08/2025    HCT 29.9 (L) 05/08/2025     05/08/2025       Sodium   Date Value Ref Range Status   01/07/2025 141 136 - 145 mmol/L Final     Potassium   Date Value Ref Range Status   01/07/2025 4.9 3.5 - 5.1 mmol/L Final     Chloride   Date Value Ref Range Status   01/07/2025 111 (H) 95 - 110 mmol/L Final     CO2   Date Value Ref Range Status   01/07/2025 21 (L) 23 - 29 mmol/L Final     Glucose   Date Value Ref Range Status   01/07/2025 101 70 - 110 mg/dL Final     BUN   Date Value Ref Range Status   01/07/2025 31 (H) 8 - 23 mg/dL Final     Creatinine   Date Value Ref Range Status   01/07/2025 2.7 (H) 0.5 - 1.4 mg/dL Final     Calcium   Date Value Ref Range Status   01/07/2025 9.4 8.7 - 10.5 mg/dL Final     Total Protein   Date Value Ref Range Status   01/07/2025 8.1 6.0 - 8.4 g/dL Final     Albumin   Date Value Ref Range Status   01/07/2025 3.9 3.5 - 5.2 g/dL Final     Total Bilirubin   Date Value Ref Range Status   01/07/2025 0.4 0.1 - 1.0 mg/dL Final      Comment:     For infants and newborns, interpretation of results should be based  on gestational age, weight and in agreement with clinical  observations.    Premature Infant recommended reference ranges:  Up to 24 hours.............<8.0 mg/dL  Up to 48 hours............<12.0 mg/dL  3-5 days..................<15.0 mg/dL  6-29 days.................<15.0 mg/dL       Alkaline Phosphatase   Date Value Ref Range Status   01/07/2025 69 55 - 135 U/L Final     AST   Date Value Ref Range Status   01/07/2025 13 10 - 40 U/L Final     ALT   Date Value Ref Range Status   01/07/2025 6 (L) 10 - 44 U/L Final     Anion Gap   Date Value Ref Range Status   01/07/2025 9 8 - 16 mmol/L Final       Xrays        Impression/Plan    Problem List Items Addressed This Visit          Pulmonary    Cavitating mass of middle lobe of right lung    We have requested discs from [facility]   CT report is stable from 1/2025 to no  Have ordered PET scan   Further decision after I am able to review old studies and PET scan  He denies any known ho of exposure to TB  No fever, chills, night sweats  He does have weight issues but this has been a problem for a while         Relevant Orders    NM PET CT FDG Skull Base to Mid Thigh    Chronic obstructive pulmonary disease - Primary    Will continue present medications  Will send for PFT and walk test to assess severity of his COPD  RTC 1 month          Relevant Orders    Complete PFT w/ bronchodilator (Completed)    Six Minute Walk Test to qualify for Home Oxygen (Completed)       Other    Cigarette smoker    We discussed about smoking  At one point he says he smoked 5-6 PPD  Will continue to follow and see if we can get him to stop          Other Visit Diagnoses         Anemia associated with stage 4 chronic renal failure          Solitary pulmonary nodule          Lung mass        Relevant Orders    NM PET CT FDG Skull Base to Mid Thigh            I have spent about 45 minutes with the patient taking  the history and examining the patient.  We have discussed the diagnoses and current plan and all questions have been answered.  We have discussed the follow up plan.  The patient and family (if present) know to contact the office with any questions they may have.        Kavin Tariq MD         [1]   Current Outpatient Medications:     azaTHIOprine (IMURAN) 50 mg Tab, Take 50 mg by mouth 2 (two) times daily., Disp: , Rfl:     brimonidine 0.2% (ALPHAGAN) 0.2 % Drop, 1 drop 2 (two) times daily., Disp: , Rfl:     dorzolamide (TRUSOPT) 2 % ophthalmic solution, Place 1 drop into both eyes 2 (two) times daily., Disp: , Rfl:     dorzolamide-timolol 2-0.5% (COSOPT) 22.3-6.8 mg/mL ophthalmic solution, Place 1 drop into the left eye 2 (two) times daily., Disp: , Rfl:     famotidine (PEPCID) 40 MG tablet, Take 40 mg by mouth 2 (two) times daily., Disp: , Rfl:     ferrous sulfate (FEOSOL) 325 mg (65 mg iron) Tab tablet, Take 325 mg by mouth with breakfast., Disp: , Rfl:     fluticasone propionate (FLONASE) 50 mcg/actuation nasal spray, 1 spray by Each Nostril route once daily., Disp: , Rfl:     latanoprostene bunod (VYZULTA) 0.024 % Drop, Apply 1 drop to eye once. LT EYE, Disp: , Rfl:     multivitamin (ONE DAILY MULTIVITAMIN) per tablet, Take 1 tablet by mouth once daily., Disp: , Rfl:     prednisoLONE sodium phosphate (INFLAMASE FORTE) 1 % Drop, Place 1 drop into the right eye once daily., Disp: , Rfl:     pyridostigmine (MESTINON) 60 mg Tab, Take 60 mg by mouth 3 (three) times daily. 1/2 tablet QID, Disp: , Rfl:     albuterol (PROVENTIL/VENTOLIN HFA) 90 mcg/actuation inhaler, Inhale 2 puffs into the lungs every 4 (four) hours as needed for Shortness of Breath., Disp: 18 g, Rfl: 11  [2]   Social History  Tobacco Use   Smoking Status Every Day    Current packs/day: 2.00    Average packs/day: 2.0 packs/day for 70.9 years (141.8 ttl pk-yrs)    Types: Cigarettes    Start date: 7/[birth year]/1954   Smokeless Tobacco Never

## 2025-06-12 ENCOUNTER — TELEPHONE (OUTPATIENT)
Dept: PULMONOLOGY | Facility: CLINIC | Age: 80
End: 2025-06-12
Payer: MEDICARE

## 2025-06-12 LAB
DLCO SINGLE BREATH LLN: 10.5
DLCO SINGLE BREATH PRE REF: 30.8 %
DLCO SINGLE BREATH REF: 17.43
DLCOC SBVA LLN: 1.8
DLCOC SBVA REF: 3.29
DLCOC SINGLE BREATH LLN: 10.5
DLCOC SINGLE BREATH REF: 17.43
DLCOVA LLN: 1.8
DLCOVA PRE REF: 36.5 %
DLCOVA PRE: 1.2 ML/(MIN*MMHG*L) (ref 1.8–4.78)
DLCOVA REF: 3.29
ERVN2 LLN: -16449.32
ERVN2 PRE REF: 134.8 %
ERVN2 PRE: 0.92 L (ref -16449.32–16450.68)
ERVN2 REF: 0.68
FEF 25 75 LLN: 0.51
FEF 25 75 PRE REF: 40.3 %
FEF 25 75 REF: 2.22
FEV1 FVC LLN: 61
FEV1 FVC PRE REF: 82.4 %
FEV1 FVC REF: 76
FEV1 LLN: 1.43
FEV1 PRE REF: 76.2 %
FEV1 REF: 2.08
FRCN2 LLN: 2.28
FRCN2 PRE REF: 132.3 %
FRCN2 REF: 3.26
FVC LLN: 1.97
FVC PRE REF: 92 %
FVC REF: 2.75
IVC PRE: 2.28 L (ref 1.97–3.54)
IVC SINGLE BREATH LLN: 1.97
IVC SINGLE BREATH PRE REF: 83.1 %
IVC SINGLE BREATH REF: 2.75
PEF LLN: 3.45
PEF PRE REF: 63 %
PEF REF: 5.21
PRE DLCO: 5.37 ML/(MIN*MMHG) (ref 10.5–24.36)
PRE FEF 25 75: 0.9 L/S (ref 0.51–3.93)
PRE FET 100: 4.84 SEC
PRE FEV1 FVC: 62.72 % (ref 60.75–90.03)
PRE FEV1: 1.59 L (ref 1.43–2.68)
PRE FRC N2: 4.32 L (ref 2.28–4.25)
PRE FVC: 2.53 L (ref 1.97–3.54)
PRE PEF: 3.28 L/S (ref 3.45–6.97)
RVN2 LLN: 1.91
RVN2 PRE REF: 131.6 %
RVN2 PRE: 3.4 L (ref 1.91–3.26)
RVN2 REF: 2.58
RVN2TLCN2 LLN: 36.57
RVN2TLCN2 PRE REF: 126.7 %
RVN2TLCN2 PRE: 57.7 % (ref 36.57–54.53)
RVN2TLCN2 REF: 45.55
TLCN2 LLN: 4.15
TLCN2 PRE REF: 111.2 %
TLCN2 PRE: 5.89 L (ref 4.15–6.45)
TLCN2 REF: 5.3
VA PRE: 4.47 L (ref 5.15–5.15)
VA SINGLE BREATH LLN: 5.15
VA SINGLE BREATH PRE REF: 86.7 %
VA SINGLE BREATH REF: 5.15
VCMAXN2 LLN: 1.97
VCMAXN2 PRE REF: 90.6 %
VCMAXN2 PRE: 2.49 L (ref 1.97–3.54)
VCMAXN2 REF: 2.75

## 2025-06-12 NOTE — TELEPHONE ENCOUNTER
----- Message from Kavin Tariq MD sent at 6/11/2025  4:25 PM CDT -----  PFT overall looks like mild COPD  ----- Message -----  From: Interface, Lab In [facility]  Sent: 6/11/2025  11:16 AM CDT  To: Kavin Tariq MD

## 2025-06-16 ENCOUNTER — TELEPHONE (OUTPATIENT)
Dept: PULMONOLOGY | Facility: CLINIC | Age: 80
End: 2025-06-16
Payer: MEDICARE

## 2025-06-16 NOTE — TELEPHONE ENCOUNTER
----- Message from Kavin Tariq MD sent at 6/16/2025  3:23 PM CDT -----  Walk test was OK  ----- Message -----  From: Interface, Lab In [facility]  Sent: 6/11/2025  12:18 PM CDT  To: Kavin Tariq MD

## 2025-07-09 ENCOUNTER — HOSPITAL ENCOUNTER (OUTPATIENT)
Dept: RADIOLOGY | Facility: HOSPITAL | Age: 80
Discharge: HOME | End: 2025-07-09
Attending: INTERNAL MEDICINE
Payer: MEDICARE

## 2025-07-09 DIAGNOSIS — J98.4 CAVITATING MASS OF MIDDLE LOBE OF RIGHT LUNG: ICD-10-CM

## 2025-07-09 DIAGNOSIS — R91.8 LUNG MASS: ICD-10-CM

## 2025-07-09 LAB — POCT GLUCOSE: 130 MG/DL (ref 70–110)

## 2025-07-11 ENCOUNTER — HOSPITAL ENCOUNTER (OUTPATIENT)
Dept: RADIOLOGY | Facility: HOSPITAL | Age: 80
Discharge: HOME | End: 2025-07-11
Attending: INTERNAL MEDICINE
Payer: MEDICARE

## 2025-07-11 VITALS — WEIGHT: 120 LBS | HEIGHT: 65 IN | BODY MASS INDEX: 19.99 KG/M2

## 2025-07-11 LAB — POCT GLUCOSE: 102 MG/DL (ref 70–110)

## 2025-07-11 PROCEDURE — 78815 PET IMAGE W/CT SKULL-THIGH: CPT | Mod: 26,PI,, | Performed by: RADIOLOGY

## 2025-07-11 PROCEDURE — 78815 PET IMAGE W/CT SKULL-THIGH: CPT | Mod: TC,PO

## 2025-07-11 PROCEDURE — A9552 F18 FDG: HCPCS | Mod: PO | Performed by: INTERNAL MEDICINE

## 2025-07-11 RX ORDER — FLUDEOXYGLUCOSE F18 500 MCI/ML
13.2 INJECTION INTRAVENOUS
Status: COMPLETED | OUTPATIENT
Start: 2025-07-11 | End: 2025-07-11

## 2025-07-11 RX ADMIN — FLUDEOXYGLUCOSE F-18 13.2 MILLICURIE: 500 INJECTION INTRAVENOUS at 02:07

## 2025-08-11 ENCOUNTER — OFFICE VISIT (OUTPATIENT)
Dept: PULMONOLOGY | Facility: CLINIC | Age: 80
End: 2025-08-11
Payer: MEDICARE

## 2025-08-11 VITALS
BODY MASS INDEX: 19.74 KG/M2 | DIASTOLIC BLOOD PRESSURE: 80 MMHG | WEIGHT: 118.63 LBS | SYSTOLIC BLOOD PRESSURE: 125 MMHG | HEART RATE: 76 BPM

## 2025-08-11 DIAGNOSIS — F17.210 CIGARETTE SMOKER: ICD-10-CM

## 2025-08-11 DIAGNOSIS — J44.9 CHRONIC OBSTRUCTIVE PULMONARY DISEASE, UNSPECIFIED COPD TYPE: Primary | ICD-10-CM

## 2025-08-11 DIAGNOSIS — J98.4 CAVITATING MASS OF MIDDLE LOBE OF RIGHT LUNG: ICD-10-CM

## 2025-08-11 PROCEDURE — 99214 OFFICE O/P EST MOD 30 MIN: CPT | Mod: S$GLB,,, | Performed by: INTERNAL MEDICINE

## 2025-08-29 ENCOUNTER — TELEPHONE (OUTPATIENT)
Facility: CLINIC | Age: 80
End: 2025-08-29
Payer: MEDICARE